# Patient Record
Sex: FEMALE | Race: ASIAN | NOT HISPANIC OR LATINO | URBAN - METROPOLITAN AREA
[De-identification: names, ages, dates, MRNs, and addresses within clinical notes are randomized per-mention and may not be internally consistent; named-entity substitution may affect disease eponyms.]

---

## 2024-05-06 ENCOUNTER — EMERGENCY (EMERGENCY)
Facility: HOSPITAL | Age: 54
LOS: 1 days | Discharge: HOME | End: 2024-05-06
Attending: EMERGENCY MEDICINE | Admitting: EMERGENCY MEDICINE
Payer: COMMERCIAL

## 2024-05-06 VITALS
TEMPERATURE: 98 F | DIASTOLIC BLOOD PRESSURE: 90 MMHG | RESPIRATION RATE: 16 BRPM | OXYGEN SATURATION: 100 % | SYSTOLIC BLOOD PRESSURE: 175 MMHG | HEART RATE: 76 BPM

## 2024-05-06 VITALS
TEMPERATURE: 98 F | OXYGEN SATURATION: 97 % | RESPIRATION RATE: 16 BRPM | HEIGHT: 63 IN | HEART RATE: 71 BPM | DIASTOLIC BLOOD PRESSURE: 107 MMHG | WEIGHT: 149.91 LBS | SYSTOLIC BLOOD PRESSURE: 179 MMHG

## 2024-05-06 LAB
ALBUMIN SERPL ELPH-MCNC: 4.2 G/DL — SIGNIFICANT CHANGE UP (ref 3.4–5)
ALP SERPL-CCNC: 51 U/L — SIGNIFICANT CHANGE UP (ref 40–120)
ALT FLD-CCNC: 25 U/L — SIGNIFICANT CHANGE UP (ref 12–42)
ANION GAP SERPL CALC-SCNC: 10 MMOL/L — SIGNIFICANT CHANGE UP (ref 9–16)
ANISOCYTOSIS BLD QL: SLIGHT — SIGNIFICANT CHANGE UP
AST SERPL-CCNC: 22 U/L — SIGNIFICANT CHANGE UP (ref 15–37)
BASOPHILS # BLD AUTO: 0.07 K/UL — SIGNIFICANT CHANGE UP (ref 0–0.2)
BASOPHILS NFR BLD AUTO: 1 % — SIGNIFICANT CHANGE UP (ref 0–2)
BILIRUB SERPL-MCNC: 0.7 MG/DL — SIGNIFICANT CHANGE UP (ref 0.2–1.2)
BUN SERPL-MCNC: 12 MG/DL — SIGNIFICANT CHANGE UP (ref 7–23)
CALCIUM SERPL-MCNC: 10.1 MG/DL — SIGNIFICANT CHANGE UP (ref 8.5–10.5)
CHLORIDE SERPL-SCNC: 102 MMOL/L — SIGNIFICANT CHANGE UP (ref 96–108)
CO2 SERPL-SCNC: 28 MMOL/L — SIGNIFICANT CHANGE UP (ref 22–31)
CREAT SERPL-MCNC: 0.79 MG/DL — SIGNIFICANT CHANGE UP (ref 0.5–1.3)
EGFR: 90 ML/MIN/1.73M2 — SIGNIFICANT CHANGE UP
EOSINOPHIL # BLD AUTO: 0.14 K/UL — SIGNIFICANT CHANGE UP (ref 0–0.5)
EOSINOPHIL NFR BLD AUTO: 2 % — SIGNIFICANT CHANGE UP (ref 0–6)
GIANT PLATELETS BLD QL SMEAR: PRESENT — SIGNIFICANT CHANGE UP
GLUCOSE SERPL-MCNC: 100 MG/DL — HIGH (ref 70–99)
HCT VFR BLD CALC: 39 % — SIGNIFICANT CHANGE UP (ref 34.5–45)
HGB BLD-MCNC: 11.9 G/DL — SIGNIFICANT CHANGE UP (ref 11.5–15.5)
HYPOCHROMIA BLD QL: SLIGHT — SIGNIFICANT CHANGE UP
IMM GRANULOCYTES NFR BLD AUTO: 0.1 % — SIGNIFICANT CHANGE UP (ref 0–0.9)
LG PLATELETS BLD QL AUTO: SLIGHT — SIGNIFICANT CHANGE UP
LIDOCAIN IGE QN: 53 U/L — SIGNIFICANT CHANGE UP (ref 16–77)
LYMPHOCYTES # BLD AUTO: 2.33 K/UL — SIGNIFICANT CHANGE UP (ref 1–3.3)
LYMPHOCYTES # BLD AUTO: 33 % — SIGNIFICANT CHANGE UP (ref 13–44)
MANUAL SMEAR VERIFICATION: SIGNIFICANT CHANGE UP
MCHC RBC-ENTMCNC: 20.4 PG — LOW (ref 27–34)
MCHC RBC-ENTMCNC: 30.5 GM/DL — LOW (ref 32–36)
MCV RBC AUTO: 66.8 FL — LOW (ref 80–100)
MICROCYTES BLD QL: SLIGHT — SIGNIFICANT CHANGE UP
MONOCYTES # BLD AUTO: 0.37 K/UL — SIGNIFICANT CHANGE UP (ref 0–0.9)
MONOCYTES NFR BLD AUTO: 5.2 % — SIGNIFICANT CHANGE UP (ref 2–14)
NEUTROPHILS # BLD AUTO: 4.15 K/UL — SIGNIFICANT CHANGE UP (ref 1.8–7.4)
NEUTROPHILS NFR BLD AUTO: 58.7 % — SIGNIFICANT CHANGE UP (ref 43–77)
NRBC # BLD: 0 /100 WBCS — SIGNIFICANT CHANGE UP (ref 0–0)
NT-PROBNP SERPL-SCNC: 59 PG/ML — SIGNIFICANT CHANGE UP
PLAT MORPH BLD: ABNORMAL
PLATELET # BLD AUTO: 255 K/UL — SIGNIFICANT CHANGE UP (ref 150–400)
PLATELET COUNT - ESTIMATE: NORMAL — SIGNIFICANT CHANGE UP
POTASSIUM SERPL-MCNC: 3.4 MMOL/L — LOW (ref 3.5–5.3)
POTASSIUM SERPL-SCNC: 3.4 MMOL/L — LOW (ref 3.5–5.3)
PROT SERPL-MCNC: 8.2 G/DL — SIGNIFICANT CHANGE UP (ref 6.4–8.2)
RAPID RVP RESULT: SIGNIFICANT CHANGE UP
RBC # BLD: 5.84 M/UL — HIGH (ref 3.8–5.2)
RBC # FLD: 15.7 % — HIGH (ref 10.3–14.5)
RBC BLD AUTO: ABNORMAL
SARS-COV-2 RNA SPEC QL NAA+PROBE: SIGNIFICANT CHANGE UP
SODIUM SERPL-SCNC: 140 MMOL/L — SIGNIFICANT CHANGE UP (ref 132–145)
TROPONIN I, HIGH SENSITIVITY RESULT: 7.3 NG/L — SIGNIFICANT CHANGE UP
WBC # BLD: 7.07 K/UL — SIGNIFICANT CHANGE UP (ref 3.8–10.5)
WBC # FLD AUTO: 7.07 K/UL — SIGNIFICANT CHANGE UP (ref 3.8–10.5)

## 2024-05-06 PROCEDURE — 71260 CT THORAX DX C+: CPT | Mod: 26,MC

## 2024-05-06 PROCEDURE — 71046 X-RAY EXAM CHEST 2 VIEWS: CPT | Mod: 26

## 2024-05-06 PROCEDURE — 99285 EMERGENCY DEPT VISIT HI MDM: CPT

## 2024-05-06 PROCEDURE — 70491 CT SOFT TISSUE NECK W/DYE: CPT | Mod: 26,MC

## 2024-05-06 NOTE — ED ADULT NURSE NOTE - OBJECTIVE STATEMENT
c/o neck swelling since yesterday, denies sob or airway involvement, no s/s of distress. Provider at bedside, will continue to monitor for change in assessment

## 2024-05-06 NOTE — ED PROVIDER NOTE - PATIENT PORTAL LINK FT
You can access the FollowMyHealth Patient Portal offered by Health system by registering at the following website: [email or web address]. By joining [employer]’s FollowMyHealth portal, you will also be able to view your health information using other applications (apps) compatible with our system.

## 2024-05-06 NOTE — ED PROVIDER NOTE - NSFOLLOWUPINSTRUCTIONS_ED_ALL_ED_FT
Parotitis  Outline of the head and upper body showing a parotid gland.   Parotitis is inflammation of one or both of the parotid glands. These glands produce saliva. They are found on each side of the face, below and in front of the earlobes. The saliva that they produce comes out of tiny openings (ducts) inside the cheeks.    Parotitis may cause sudden swelling and pain (acute parotitis). It can also cause repeated episodes of swelling and pain or continued swelling that may or may not be painful (chronic parotitis).    What are the causes?  This condition may be caused by:  Infections from bacteria.  Infections from viruses, such as mumps or HIV.  Blockage (obstruction) of saliva flow through the parotid glands. This can be from a stone, scar tissue, or a tumor.  Diseases that cause your body's defense system (immune system) to attack healthy cells in your salivary glands. These are called autoimmune diseases.  What increases the risk?  You are more likely to develop this condition if:  You are 50 years old or older.  You do not drink enough fluids (are dehydrated).  You drink too much alcohol.  You have:  A dry mouth.  Diabetes.  Gout.  A long-term illness.  You do not take good care of your mouth and teeth (poor oral hygiene).  You have had radiation treatments to the head and neck.  You take certain medicines.  What are the signs or symptoms?  Symptoms of this condition depend on the cause. Symptoms may include:  Swelling under and in front of the ear. This may get worse after eating.  Pain and tenderness over the parotid gland. This may get worse after eating.  Redness and warmth of the skin over the parotid gland.  Fever or chills.  Pus coming from the ducts inside the mouth.  Dry mouth.  A bad taste in the mouth.  How is this diagnosed?  This condition may be diagnosed based on:  Your medical history.  A physical exam.  Tests to find the cause of the parotitis. These may include:  Doing blood tests to check for an autoimmune disease or infections from a virus.  Taking a fluid sample from the parotid gland and testing it for infection.  Injecting the ducts of the parotid gland with a dye and then taking X-rays (sialogram).  Having other imaging tests of the gland, such as X-rays, ultrasound, MRI, or CT scan.  Checking the opening of the gland for a stone or obstruction.  Placing a needle into the gland to remove tissue for a biopsy (fine needle aspiration).  How is this treated?  Treatment for this condition depends on the cause. Treatment may include:  Antibiotic medicine for a bacterial infection.  NSAIDs, such as ibuprofen, to treat pain and swelling.  Drinking more fluids.  Removing a stone or obstruction.  Treating an underlying disease that is causing parotitis.  Surgery to drain an infection, remove a growth, or remove the whole gland (parotidectomy).  Treatment may not be needed if parotid swelling goes away with home care.    Follow these instructions at home:  Medicines    A prescription pill bottle with an example of a pill.  Take over-the-counter and prescription medicines only as told by your health care provider.  If you were prescribed an antibiotic medicine, take it as told by your health care provider. Do not stop taking the antibiotic even if you start to feel better.  Managing pain and swelling    If directed, apply heat to the affected area as often as told by your health care provider. Use the heat source that your health care provider recommends, such as a moist heat pack or a heating pad.  Place a towel between your skin and the heat source.  Leave the heat on for 20–30 minutes.  Remove the heat if your skin turns bright red. This is especially important if you are unable to feel pain, heat, or cold. You have a greater risk of getting burned.  Gargle with a mixture of salt and water 3–4 times a day or as needed. To make salt water, completely dissolve ½–1 tsp (3–6 g) of salt in 1 cup (237 mL) of warm water.  Gently massage the parotid glands as told by your health care provider.  General instructions    Using a toothbrush to brush the front and back sides of the teeth.  Drink enough fluid to keep your urine pale yellow.  Keep your mouth clean and moist.  Try sucking on sour candy. This may help to make your mouth less dry by stimulating the flow of saliva.  Maintain good oral health.  Brush your teeth at least two times a day.  Floss your teeth every day.  See your dentist regularly.  Do not use any products that contain nicotine or tobacco. These products include cigarettes, chewing tobacco, and vaping devices, such as e-cigarettes. If you need help quitting, ask your health care provider.  Do not drink alcohol.  Keep all follow-up visits. This is important.  Contact a health care provider if:  You have a fever or chills.  You have new symptoms.  Your symptoms get worse.  Your symptoms do not improve with treatment.  Get help right away if:  You have difficulty breathing or swallowing because of the swollen gland.  These symptoms may represent a serious problem that is an emergency. Do not wait to see if the symptoms will go away. Get medical help right away. Call your local emergency services (911 in the [place] Do not drive yourself to the hospital.    Summary  Parotitis is inflammation of one or both of the parotid glands.  Symptoms include pain and swelling under and in front of the ear. They may also include a fever and a bad taste in your mouth.  This condition may be treated with antibiotics, NSAIDs, increasing fluids, or surgery.  In some cases, parotitis may go away on its own without treatment.  You should drink plenty of fluids, maintain good oral health, and do not use products that contain nicotine or tobacco.  This information is not intended to replace advice given to you by your health care provider. Make sure you discuss any questions you have with your health care provider.

## 2024-05-06 NOTE — ED PROVIDER NOTE - OBJECTIVE STATEMENT
54yo F no pmh presents with b/l neck swelling x1 day.  No sore throat, no difficulty swallowing or breathing.  No rash or hives.  Took benadryl and felt mildly improved.  had recent nasal congestion.  No cough, no CP or SOB.  No similar symptoms in past.  Went to , had CXR which was negative, and was sent to ED for further eval.

## 2024-05-06 NOTE — ED PROVIDER NOTE - PHYSICAL EXAMINATION
Constitutional: awake and alert, in no acute distress  HEENT: head normocephalic and atraumatic. moist mucous membranes, posterior oropharynx nml appearing, no swelling of tonsils, soft palate, or uvula.  airway patent.  Eyes: extraocular movements intact, normal conjunctiva  Neck: supple, normal ROM.  fullness of b/l neck submandibularly   Cardiovascular: regular rate   Pulmonary: no respiratory distress  Gastrointestinal: abdomen flat and nondistended  Skin: warm, dry, normal for ethnicity  Musculoskeletal: no edema, no deformity  Neurological: oriented x4, no focal neurologic deficit.   Psychiatric: calm and cooperative

## 2024-05-06 NOTE — ED PROVIDER NOTE - CLINICAL SUMMARY MEDICAL DECISION MAKING FREE TEXT BOX
Pt w no significant pmh presents with 1d of neck swelling.  On exam afebrile, VSS, well appearing, with b/l neck swelling and fullness at angle of mandible.  No airway compromise.  No JVD.  No palpable LAD.  Labs unrevealing.  CT neck neg for deep space infection.  Shows b/l parotitis.  CT chest neg for SVC syn.  likely viral parotitis.  RVP sent.  Pt feels reassured, wants to go home.  Return precautions discussed.  Stable for dc.  Of note, breast microcalcifications seen incidentally on CT chest discussed w pt -- pt gets mammograms and breast US yearly.  Has mammogram coming up.

## 2024-05-06 NOTE — ED ADULT NURSE NOTE - CHIEF COMPLAINT QUOTE
Pt with 1 day of face and neck swelling, pt denies difficulty swallowing or breathing. No new medications, takes a daily statin that she has been on for years. No itching. No other areas of swelling. Pt took 25mg of benadryl this AM and 3 benadryl yesterday with slight improvement in swelling. Pt hypertensive x2 in triage, denies hx of the same.

## 2024-05-06 NOTE — ED ADULT TRIAGE NOTE - CHIEF COMPLAINT QUOTE
Pt with 1 day of face and neck swelling, pt denies difficulty swallowing or breathing. No new medications. No itching. No other areas of swelling. Pt took 25mg of benadryl this AM and 3 benadryl yesterday with slight improvement in swelling. Pt hypertensive x2 in triage, denies hx of the same. Pt with 1 day of face and neck swelling, pt denies difficulty swallowing or breathing. No new medications, takes a daily statin that she has been on for years. No itching. No other areas of swelling. Pt took 25mg of benadryl this AM and 3 benadryl yesterday with slight improvement in swelling. Pt hypertensive x2 in triage, denies hx of the same.

## 2024-05-06 NOTE — ED PROVIDER NOTE - DIFFERENTIAL DIAGNOSIS
Differential Diagnosis SVC syn, allergic rxn, deep space infection, sialoadenitis, sialolithiasis, other

## 2024-05-09 DIAGNOSIS — R22.1 LOCALIZED SWELLING, MASS AND LUMP, NECK: ICD-10-CM

## 2024-05-09 DIAGNOSIS — Z20.822 CONTACT WITH AND (SUSPECTED) EXPOSURE TO COVID-19: ICD-10-CM

## 2024-05-09 DIAGNOSIS — K11.20 SIALOADENITIS, UNSPECIFIED: ICD-10-CM
